# Patient Record
Sex: MALE | Race: OTHER
[De-identification: names, ages, dates, MRNs, and addresses within clinical notes are randomized per-mention and may not be internally consistent; named-entity substitution may affect disease eponyms.]

---

## 2021-02-26 ENCOUNTER — HOSPITAL ENCOUNTER (EMERGENCY)
Dept: HOSPITAL 8 - ED | Age: 21
Discharge: HOME | End: 2021-02-26
Payer: COMMERCIAL

## 2021-02-26 VITALS — BODY MASS INDEX: 24.73 KG/M2 | WEIGHT: 163.14 LBS | HEIGHT: 68 IN

## 2021-02-26 VITALS — SYSTOLIC BLOOD PRESSURE: 122 MMHG | DIASTOLIC BLOOD PRESSURE: 68 MMHG

## 2021-02-26 DIAGNOSIS — F17.200: ICD-10-CM
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
## 2021-02-26 DIAGNOSIS — N50.812: ICD-10-CM

## 2021-02-26 DIAGNOSIS — S96.911A: Primary | ICD-10-CM

## 2021-02-26 DIAGNOSIS — B35.4: ICD-10-CM
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
## 2021-02-26 DIAGNOSIS — M79.606: ICD-10-CM

## 2021-02-26 PROCEDURE — 76870 US EXAM SCROTUM: CPT

## 2021-02-26 PROCEDURE — 99284 EMERGENCY DEPT VISIT MOD MDM: CPT

## 2021-02-26 NOTE — NUR
PT IS A 20M COMPLAINING OF RIGHT ANKLE PAIN AND A NODULE ON HIS RIGHT TESTICLE. 
HE DENIES TRAUMA TO THE ANKLE BUT HAS PAIN BEARING WEIGHT. HE IS UNSURE HOW 
LONG THE NODULE HAS BEEN THERE BUT AT LEAST 6 MONTHS. DENIES PAIN. PROVIDER AT 
BEDSIDE FOR EVAL. CALL LIGHT WITHIN REACH.